# Patient Record
(demographics unavailable — no encounter records)

---

## 2024-10-17 NOTE — DISCUSSION/SUMMARY
[FreeTextEntry1] : 33 YEAR OLD FEMALE LMP 9/11/2024 ON OCP, APRI, WITH CYCLES MONTHLY X 4, NOT HEAVY OR PAINFUL ON OCP, PRESENTS FOR 6 MONTH VISIT FOR CONTINUED SURV EILLANCE OF OCP. LAST SEEN FOR WELL WOMAN VISIT AND PAP 3/12/2024. PAP AND HPV HR TESTING DONE AT THAT TIME WERE NEGATIVE.  NEW FAM HISTORY OF MATERNAL GRANDMOTHER, MOTHER WITH FIBROIDS.PATERNAL GRANDMOTHER WITH BREAST CANCER IN HER 50'S-DUCTAL AND PATERNAL 1ST COUSIN WITH BREAST CANCER AT APPROXIMATELY AGE 39. MEDICATIONS; OCP MEDCIATION ALLERGIES; PENICILLIN- THROAT CLOSES ROS;BMS-NORMAL; NO FAM HISTORY OF BREAST CANCER          NO URINARY COMPLAINTS         SEXUALLY ACTIVE -OCP-PARTNER OF ONE YEAR; UNCOMFROTABLE AT TIMES=?                  POSITIONAL. BREASTS; DOES BREAST SELF EXAMIANTION                    FAM HISTORY OF PATERNAL  1ST COUSIN WITH BREAST CANCER < 40                                      PATERNAL GRANDMOTHER WITH BREAST CANCER              +EXERCISE; + DAIRY ;NO TOBACCO OR VAPING  PE;/68; TEMP 97.3; WEIGHT 155 LBS HEIGHT 5'4"      BREASTS; NO MASSES OR DISCHARGES      ABDMOEN;SOFT, N O MASSES; NO TENDERNESS TO PALPATION     PELVIC; NORMAL EXTERNAL GENITALIA                    NORMAL URETHRAL MEATUS                    NORMAL VAGINA WITHOUT LESION                    NORMAL APPEARING CERVIX SLIGHTLY DEVIATED TO RT SIDE OF VAGINA                   NORMAL ANTEFLESED UTEURS ON BIMANUAL EXAMINATION                   NO PALPABLE ADNEXAL MASSES  IMP; ENCOUNTER FOR CONTINUED SURVEILLANCE OF OCP          DYSPAREUNIA-MILD          HISTORY OF DYSMENORRHEA IMPROVED WITH OCP          HISTORY OF MENORRHAGIA IMPROVED WITH OCP          FAM HISTORY OF PREMENOPAUSAL BREAST CANCER  PLAN; NO PAP DONE            E PRESCRIBED APRI OCP X 6 MONTHS            BREAST SELF EXAMINATION MONTHLY CONTINUED TO BE STRONGLY ADVISED            SENDING PT FOR HEREDITARY BREAST CANCER SCREENING - EMPOWER -ASHELY               PT TO CALL FOR RESULTS IN 2 WKS            RETURN TO OFFICE 6 MONTHS FOR WELL WOMAN VISIT, PAP AND FOR CONTINUED               SURVEILLANCE OF OCP

## 2025-01-28 NOTE — PHYSICAL EXAM
[Alert] : alert [Normal Voice/Communication] : normal voice/communication [No Acute Distress] : no acute distress [Well Developed] : well developed [Well Nourished] : well nourished [Sclera] : the sclera and conjunctiva were normal [Hearing Threshold Finger Rub Not Forrest] : hearing was normal [Normal Appearance] : the appearance of the neck was normal [No Respiratory Distress] : no respiratory distress [No Acc Muscle Use] : no accessory muscle use [Respiration, Rhythm And Depth] : normal respiratory rhythm and effort [Auscultation Breath Sounds / Voice Sounds] : lungs were clear to auscultation bilaterally [Heart Rate And Rhythm] : heart rate was normal and rhythm regular [Normal S1, S2] : normal S1 and S2 [Bowel Sounds] : normal bowel sounds [Abdomen Tenderness] : non-tender [No Masses] : no abdominal mass palpated [Abdomen Soft] : soft [Abnormal Walk] : normal gait [Normal Color / Pigmentation] : normal skin color and pigmentation [Oriented To Time, Place, And Person] : oriented to person, place, and time

## 2025-01-28 NOTE — HISTORY OF PRESENT ILLNESS
[FreeTextEntry1] : 34 yr old female with H/O Anxiety, H/O Dysmenorrhea (controlled by BCP), was referred here by PCP Dr. Abbott for further evaluation of unintentional weight loss.   She C/O an unintentional weight loss of 20-25 lbs over the past 3 months. She C/O a poor appetite x 3 months with decreased po intake. She reports being constipated over the past 2-3 months but sometimes has diarrhea infrequently, 1-2 times a day when it occurs. She has cut down on coffee intake which has helped with the diarrhea. She denied abdominal pain, heartburn, nausea, vomiting, melena, or hematochezia.   She is allergic to peanuts and soy. When she ingests soy, she experiences bloating and diarrhea so she tries as best she can to avoid all soy containing products.   She never had an EGD or colonoscopy.  US of Abdomen done 1/13/25 reviewed and was normal, no gallstones, dilated ducts, or lesions noted  Labs 12/10/24: CBC normal CMP normal Iron 125 Ferritin 74 % sat 30  T4 17.6 TSH 1.12  Lipase normal Ca 19-9 normal CEA level normal Ca 125 normal

## 2025-01-28 NOTE — ASSESSMENT
[FreeTextEntry1] : 34 yr old female with H/O Anxiety, H/O Dysmenorrhea (controlled by BCP), was referred here by PCP Dr. Abbott for further evaluation of unintentional weight loss.   She C/O an unintentional weight loss of 20-25 lbs over the past 3 months. She C/O a poor appetite x 3 months with decreased po intake. She reports being constipated over the past 2-3 months but sometimes has diarrhea infrequently, 1-2 times a day when it occurs. She has cut down on coffee intake which has helped with the diarrhea. She denied abdominal pain, heartburn, nausea, vomiting, melena, or hematochezia.     Poor Appetite with significant unintentional weight loss, R/O Malignancy Constipation most likely due to poor po intake - Labs and US reviewed and were unrevealing - Will get a CT scan of Abdomen and Pelvis with IV Contrast to better assess the pancreas to R/O a malignancy - Will schedule an EGD and Colonoscopy; risks and benefits discussed - She was told to increase her fiber and water intake  - She was told to make sure she has regular BMs q day x 2 weeks before the colonoscopy by taking colace, senna, and/or miralax OD to bid  Pt verbalized understanding of the above and agreed with the plan. F/U after the procedures are done

## 2025-04-10 NOTE — DISCUSSION/SUMMARY
[FreeTextEntry1] : 34 YEAR OLD  FEMALE LMP 3/24/2025 ON OCP, APRI, WITH CYCLES MONTHLY  X 4 PRESENTS FOR ANNUAL WELL WOMAN GYNECOLOGIC VISIT AND FOR CONTINUED SURVEILLANCE OF OCP.  LAST SEEN FOR GYNECOLOGIC EXAMINATION 10/15/2024. LAST WELL  WOMAN VISIT AND PAP DONE 3/7/2024; PAP AND HPV HR TESTING DONE AT THAT TIME WERE NEGATIVE.  NEW MEDICAL HISTORY OF HAVING LOST 30 LBS SINCE 10/2024. [facility]CP S[facility] DR. DAMICO AND PT WAS SENT TO AN ENDOCRINOLOGIST ( ON [facility]). PT HAD RECENT CT SCAN OF ABDOMEN AND PELVIS WHICH SHOWED NORMAL PELVC VISCERA.   RECENT THYROID SCAN WITH 1 NODULES- ONE BENIGN AND 1  "TRI 3". STILL WITH DIGESTIVE AND THYROID ISSUES IN ADDITION TO ANXIETY MEDICATIONS; OCP -APRI MEDICATION ALLERGIES; AMOXICILLIN , PENICILLIN- THROAT CLOSES ROS;BMS-VARY FROM CONSITPATION TO DIARRHEA; NO FAM HISTORY OF COLON CANCE          NO URINARYU COMPLAINTS          SEXUALLYY ACTIVE -OCP- OCCASIONAL DISCOMFORT ON DEEP PENETRATION - SAME                         PARTNER BREASTS; DOES BREAST SELF EXAMINATION                    BREAST CANCER ON FATHERS SIDE    BRCA TESTING WAS NEGATIVE DECREASED EXERCISE LATELY; NO DAIRY - ? LACTOSE INTOL. NO TOBACCO OR VAPING  PE;/64; TEMP 97.4; WEIGHT 125 LBS HEIGHT 5'4"       BREASTS; NO MASSES OR DISCHARGES       ABDOMEN;SOFT, NO MASSES; NO TENDERNESS TO PALPATION       PELVIC; NORMAL EXTERNAL GENITALIA                      NORMAL URETHRAL MEATUS                      NORMAL VAGINA WITHOUT LESION                      NORMAL CERVIX WITHOUT LESION                      ANTEVERTED NORMAL UTERUS ON BIMANUAL EXAMINATION                      NO PALPABLE ADNEXAL MASSES  IMP; WELL L WOMAN         SURVEILLANCE OF OCP         ANXIETY         ? IBS         UNEXPLAINED WEIGHT LOSS  PLAN; THIN PREP PAP WITH HR HPV TESTING DONE-PT TO CALL IN 2 WKS FOR RESULTS             BREAST SELF EXAMINATION MONTHLY CONTINUED TO BE STRONGLY ADVISED             E PRESCRIBED APRI OCP X 6 MONTHS              RETURN TO OFFICE 6 MONTHS FOR VISIT FOR CONTINUED SURV. OF OCP OR RTO PRN